# Patient Record
Sex: FEMALE | Race: AMERICAN INDIAN OR ALASKA NATIVE
[De-identification: names, ages, dates, MRNs, and addresses within clinical notes are randomized per-mention and may not be internally consistent; named-entity substitution may affect disease eponyms.]

---

## 2017-08-06 ENCOUNTER — HOSPITAL ENCOUNTER (EMERGENCY)
Dept: HOSPITAL 42 - ED | Age: 22
Discharge: HOME | End: 2017-08-06
Payer: COMMERCIAL

## 2017-08-06 VITALS
DIASTOLIC BLOOD PRESSURE: 80 MMHG | OXYGEN SATURATION: 99 % | TEMPERATURE: 99.6 F | SYSTOLIC BLOOD PRESSURE: 129 MMHG | RESPIRATION RATE: 18 BRPM | HEART RATE: 75 BPM

## 2017-08-06 VITALS — BODY MASS INDEX: 20.3 KG/M2

## 2017-08-06 DIAGNOSIS — F41.9: Primary | ICD-10-CM

## 2017-08-06 NOTE — ED PDOC
Arrival/HPI





- General


Chief Complaint: Upper Extremity Problem/Injury


Time Seen by Provider: 08/06/17 07:51





- History of Present Illness


Narrative History of Present Illness (Text): 








21yo female in the ER with an anxiety attack. pt states she has a hx of anxiety 

and this felt similar to her previous anxiety episodes. Pt denies any suicidal 

or homicidal ideations. Pt states that her episode has simultaneously resolved 

and she now feels well. No complaints at this time. 








Past Medical History





- Provider Review


Nursing Documentation Reviewed: Yes





- Past History


Past History: No Previous





- Tetanus Immunization


Tetanus Immunization: Unknown





- Psychiatric


Hx Anxiety: Yes


Hx Depression: Yes


Hx Substance Use: No





- Past Surgical History


Past Surgical History: No Previous





- Suicidal Assessment


Feels Threatened In Home Enviroment: No





Family/Social History


Family/Social History: Unknown Family HX


Smoking Status: Never Smoked


Hx Alcohol Use: No


Hx Substance Use: No


Hx Substance Use Treatment: No





Allergies/Home Meds


Allergies/Adverse Reactions: 


Allergies





No Known Allergies Allergy (Verified 08/06/17 07:27)


 








Home Medications: 


 Home Meds











 Medication  Instructions  Recorded  Confirmed


 


Ciprofloxacin [Cipro] 500 mg PO Q12 08/06/17 08/06/17


 


Escitalopram [Lexapro] 20 mg PO DAILY 08/06/17 08/06/17














Physical Exam





- Physical Exam


Narrative Physical Exam (Text): 








- Review of Systems





Constitutional: Normal.  absent: Fatigue, Weight Change, Fevers


Eyes: Normal


ENT: denies sore throat, denies tristhmus


Respiratory: Normal.  absent: SOB, Cough, Sputum


Cardiovascular: absent: Chest Pain, Palpitations, Syncope 


Gastrointestinal: Normal.  absent: Abdominal Pain, Diarrhea, Nausea, Vomiting


Genitourinary: Normal.  absent: Dysuria, Frequency, Hematuria, vaginal bleeding


Musculoskeletal: Normal.  absent: Arthralgias, Back Pain, Neck Pain


Skin: no rashes, no erythema


Neurological: absent: Focal Weakness


Endocrine: Normal


Hemo/Lymphatic: Normal


Psychiatric: No suicidal or homicidal ideations. Anxiety resolved





Physical exam





Patient appears age appropriate in no distress, speaking full sentences without 

difficulty





- Systems Exam


Head: Present: Atraumatic, Normocephalic


Pupils: Present: PERRL


Extroacular Muscles: Present: EOMI


Conjunctiva: Present: Normal


Mouth: Present: Moist Mucous Membranes


Neck: Present: Normal Range of Motion.  No: MIDLINE TENDERNESS, Paraspinal 

Tenderness


Respiratory/Chest: Present: Clear to Auscultation, Good Air Exchange.  No: 

Respiratory Distress, Accessory Muscle Use, Tachypneic


Cardiovascular: Present: Regular Rate and Rhythm, Normal S1, S2, Peripheal 

Pulses Present.  No: Murmurs


Abdomen: Present: Normal Bowel Sounds.  No: Tenderness, Distention, Peritoneal 

Signs, Rebound, Guarding


Back: Present: Normal Inspection.  No: Midline Tenderness, Paraspinal Tenderness


Upper Extremity: Present: Normal Inspection.  No: Cyanosis, Edema


Lower Extremity: Present: Normal Inspection.  No: Edema


Neurological: Present: GCS=15, Speech Normal, cranial nerves II through XII 

fully intact with no cerebellar abnormality, neurosensory fully intact. No 

focal neurological deficits.


Skin: Present: Warm, Dry, Normal Color.  No: Rashes


Lymphatic: Present: OX3, NI, NC


Psychiatric: Present: Alert, Oriented x 3, Normal Insight, Normal Concentration


Vital Signs Reviewed: Yes





Vital Signs











  Temp Pulse Resp BP Pulse Ox


 


 08/06/17 07:23  99.6 F  75  18  129/80  99











Temperature: Afebrile


Blood Pressure: Normal


Pulse: Regular


Respiratory Rate: Normal


Appearance: Positive for: Well-Appearing


Pain Distress: None


Mental Status: Positive for: Alert and Oriented X 3





Medical Decision Making


ED Course and Treatment: 





pt with anxiety episode which has spontaneously resolved


currently in no distress and denies complaints


states she feels comfortable being dc'd home with outpatient f/u





Pt states she understands to return to the ER right away for new or worsening 

symptoms or for inability to f/u with PMD or specialist as instructed. 





Patient states that she fully agrees with and understands discharge 

instructions. States that she agrees with the plan and disposition. Verbalized 

and repeated discharge instructions and plan. I have given the patient 

opportunity to ask any additional questions. 





Disposition/Present on Arrival





- Present on Arrival


Any Indicators Present on Arrival: No


History of DVT/PE: No


History of Uncontrolled Diabetes: No


Urinary Catheter: No


History of Decub. Ulcer: No


History Surgical Site Infection Following: None





- Disposition


Have Diagnosis and Disposition been Completed?: Yes


Diagnosis: 


 Anxiety





Disposition: HOME/ ROUTINE


Disposition Time: 08:30


Patient Plan: Discharge


Condition: GOOD


Discharge Instructions (ExitCare):  Anxiety (ED)


Additional Instructions: 


PLEASE RETURN TO THE EMERGENCY DEPARTMENT FOR NEW OR WORSENING SYMPTOMS. RETURN 

RIGHT AWAY IF YOU CANNOT FOLLOW UP WITH YOUR PRIMARY CARE DOCTOR, CLINIC, OR 

SPECIALIST IN 1-2 DAYS. 


Referrals: 


PCP,NO [Primary Care Provider] - Follow up with primary


Shanelle Louise MD [Staff Provider] - Follow up with primary


Forms:  [facility] (English)

## 2019-02-10 ENCOUNTER — HOSPITAL ENCOUNTER (EMERGENCY)
Dept: HOSPITAL 42 - ED | Age: 24
Discharge: HOME | End: 2019-02-10
Payer: COMMERCIAL

## 2019-02-10 VITALS — DIASTOLIC BLOOD PRESSURE: 68 MMHG | SYSTOLIC BLOOD PRESSURE: 128 MMHG | TEMPERATURE: 97.8 F | HEART RATE: 76 BPM

## 2019-02-10 VITALS — OXYGEN SATURATION: 98 % | RESPIRATION RATE: 18 BRPM

## 2019-02-10 VITALS — BODY MASS INDEX: 20.3 KG/M2

## 2019-02-10 DIAGNOSIS — L27.2: Primary | ICD-10-CM

## 2019-02-10 NOTE — ED PDOC
Arrival/HPI





- General


Historian: Patient





- History of Present Illness


Narrative History of Present Illness (Text): 





02/10/19 01:48


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 

developing an allergic reaction at 10 PM tonight after eating mushrooms at 4 PM,

states that she had itchiness around her chin and her throat. Patient states 

that she took a dose of Allegra with improvement of her symptoms. However she 

wanted to be evaluated in the emergency room.   Otherwise patient reports 

improvement of her symptoms, (-) facial / throat swelling, (-) tongue / lip 

swelling, (-) dyspnea, (-) cough, (-) wheezing, (-) abdominal pain, (-) nausea 

(-) vomiting. 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 

mushrooms.





<Yulisa Valero PA-C - Last Filed: 02/10/19 02:04>





<Tyler Faustin - Last Filed: 02/10/19 02:10>





- General


Chief Complaint: Allergic Reaction


Time Seen by Provider: 02/10/19 01:07





Past Medical History





- Past History


Past History: No Previous





- Infectious Disease


Hx of Infectious Diseases: None





- Tetanus Immunization


Tetanus Immunization: Unknown





- Reproductive


Currently Pregnant: No





- Psychiatric


Hx Anxiety: Yes


Hx Depression: Yes


Hx Substance Use: No





- Past Surgical History


Past Surgical History: No Previous





- Suicidal Assessment


Feels Threatened In Home Enviroment: No





<Yulisa Valero PA-C - Last Filed: 02/10/19 02:04>





Family/Social History


Family/Social History: No Known Family HX


Smoking Status: Never Smoked


Hx Alcohol Use: No


Hx Substance Use: No


Hx Substance Use Treatment: No





<Yulisa Valero PA-C - Last Filed: 02/10/19 02:04>





Allergies/Home Meds





<Yulisa Valero PA-C - Last Filed: 02/10/19 02:04>





<Tyler Faustin - Last Filed: 02/10/19 02:10>


Allergies/Adverse Reactions: 


Allergies





mushroom Allergy (Verified 02/10/19 01:08)


   RASH


tomato Adverse Reaction (Verified 02/10/19 01:08)


   VOMITING








Home Medications: 


                                    Home Meds











 Medication  Instructions  Recorded  Confirmed


 


Ciprofloxacin [Cipro] 500 mg PO Q12 08/06/17 08/06/17


 


Escitalopram [Lexapro] 20 mg PO DAILY 08/06/17 08/06/17














Review of Systems





- Review of Systems


Constitutional: absent: Fatigue


ENT: absent: Sore Throat, Rhinorrhea


Respiratory: absent: SOB, Cough


Cardiovascular: absent: Chest Pain, Palpitations


Gastrointestinal: absent: Abdominal Pain, Nausea, Vomiting


Musculoskeletal: absent: Arthralgias, Back Pain, Neck Pain


Skin: Pruritis.  absent: Rash, Skin Lesions





<Yulisa Valero PA-C - Last Filed: 02/10/19 02:04>





Physical Exam





Vital Signs











  Temp Pulse Resp BP Pulse Ox


 


 02/10/19 01:24  98.1 F  78  18  130/77  98











Temperature: Afebrile


Blood Pressure: Normal


Pulse: Regular


Respiratory Rate: Normal


Appearance: Positive for: Well-Appearing, Non-Toxic, Comfortable


Pain Distress: None


Mental Status: Positive for: Alert and Oriented X 3





- Systems Exam


Head: Present: Atraumatic, Normocephalic.  No: Swelling


Pupils: Present: PERRL


Extroacular Muscles: Present: EOMI


Conjunctiva: Present: Normal


Mouth: Present: Moist Mucous Membranes, Normal Lips, Normal Tounge


Pharnyx: Present: Normal.  No: ERYTHEMA, EXUDATE, TONSILS ENLARGED, Peritonsilar

 Swelling, Muffled/Hoarse Voice, Strider


Neck: Present: Normal Range of Motion.  No: Lymphadenopathy


Respiratory/Chest: Present: Clear to Auscultation, Good Air Exchange.  No: 

Respiratory Distress, Accessory Muscle Use


Cardiovascular: Present: Regular Rate and Rhythm, Normal S1, S2.  No: Murmurs


Back: Present: Normal Inspection


Upper Extremity: Present: Normal Inspection.  No: Cyanosis, Edema


Lower Extremity: Present: Normal Inspection.  No: Edema


Neurological: Present: GCS=15, CN II-XII Intact, Speech Normal, Motor Func 

Grossly Intact, Normal Sensory Function


Skin: Present: Warm, Dry, Normal Color.  No: Rashes


Psychiatric: Present: Alert, Oriented x 3, Normal Insight, Normal Concentration





<Yulisa Valero PA-C - Last Filed: 02/10/19 02:04>





Vital Signs











  Temp Pulse Resp BP Pulse Ox


 


 02/10/19 01:24  98.1 F  78  18  130/77  98














<Tyler Faustin - Last Filed: 02/10/19 02:10>





Medical Decision Making


ED Course and Treatment: 





02/10/19 01:49


Patient given prednisone po and pepcid po. 





Advised to follow up with primary care physician or referral provided in 1-2 

days without fail. Advised to take otc allegra is symptoms continue. Return to 

the emergency room at any time for any new or worsening symptoms.





Patient states she fully agrees with and understands discharge instructions. 

States that she agrees with the plan and disposition. Verbalized and repeated 

discharge instructions and plan. I have given the patient opportunity to ask any

 additional questions.





<Yulisa Valero PA-C - Last Filed: 02/10/19 02:04>





- Medication Orders


Current Medication Orders: 














Discontinued Medications





Famotidine (Pepcid)  40 mg PO STAT STA


   Stop: 02/10/19 01:46


   Last Admin: 02/10/19 02:03  Dose: 40 mg





Prednisone (Prednisone Tab)  60 mg PO STAT STA


   Stop: 02/10/19 01:46


   Last Admin: 02/10/19 02:01  Dose: 60 mg











<Tyler Faustin - Last Filed: 02/10/19 02:10>





- PA / NP / Resident Statement


GAIL has reviewed & agrees with the documentation as recorded.





<Yulisa Valero PA-C - Last Filed: 02/10/19 02:04>





- PA / NP / Resident Statement


GAIL has reviewed & agrees with the documentation as recorded.





<Tyler Faustin - Last Filed: 02/10/19 02:10>





Disposition/Present on Arrival





- Present on Arrival


Any Indicators Present on Arrival: No


History of DVT/PE: No


History of Uncontrolled Diabetes: No


Urinary Catheter: No


History of Decub. Ulcer: No


History Surgical Site Infection Following: None





- Disposition


Have Diagnosis and Disposition been Completed?: Yes


Disposition Time: 01:45


Patient Plan: Discharge





<Yulisa Valero PA-C - Last Filed: 02/10/19 02:04>





<Tyler Faustin - Last Filed: 02/10/19 02:10>





- Disposition


Diagnosis: 


 Food allergy





Disposition: HOME/ ROUTINE


Condition: STABLE


Discharge Instructions (ExitCare):  Food Allergy


Additional Instructions: 


Thank you for letting us take care of you today. You were treated for food 

allergic reaction. The emergency medical care you received today was directed at

 your acute symptoms. It may take several days for your symptoms to resolve. 

Return to the Emergency Department if your symptoms worsen, do not improve, or 

if you have any other problems.





Please contact your doctor in 2 days for re-evaluation and follow up / or call 

one of the physicians/clinics you have been referred to that are listed on the 

Patient Visit Information form that is included in your discharge packet. Bring 

any paperwork you were given at discharge with you along with any medications 

you are taking to your follow up visit. Our treatment cannot replace ongoing 

medical care by a primary care provider (PCP) outside of the emergency 

department.





Thank you for allowing the [facility] team to be part of your care today.





Referrals: 


Fredi Landin MD [Staff Provider] - Follow up with primary


Forms:  [facility] (English), WORK NOTE, SCHOOL NOTE